# Patient Record
(demographics unavailable — no encounter records)

---

## 2019-01-01 NOTE — NBADM
[place] Admission Note


Date of Admission


2019 at 09:15





History


This is a baby boy born at 39-3/7 weeks of gestational age via spontaneous 

vaginal delivery to a 23-year-old  (G) 2 para (P) 2 mother who is blood 

type O positive, hepatitis B negative, rapid plasma reagin (RPR) negative, HIV 

negative, group B Streptococcus negative. Rupture of membranes one hour prior to

delivery with clear fluid. Apgar scores were 8 at one minute and 9 at five 

minutes. Baby was admitted to the Mother-Baby unit.





Physical Examination


Physical Measurements


On admission, the baby's weight is 3180 grams which is 7 lbs. 0 oz.


, length is 51 cm, and head circumference is 35.5 cm.


Vital Signs





Vital Signs








  Date Time  Temp Pulse Resp B/P (MAP) Pulse Ox O2 Delivery O2 Flow Rate FiO2


 


19 09:58 98.3 153 58 62/30 (41)  Room Air  








General:  Positive: Active, Other (alert and appropriately responsive); 


   Negative: Dysmorphic Features


HEENT:  Positive: Normocephalic, Anterior [place] Open, Positive Red Reflexes

Branden


Heart:  Positive: S1,S2; 


   Negative: Murmur


Lungs:  Positive: Good Bilateral Air Entry; 


   Negative: Grunting and Retractions


Abdomen:  Positive: Soft; 


   Negative: Distended


Male Genitalia:  Positive: Nl Term Male Genitalia


Extremities:  Positive: Other (both hips stable with normal Ortolani and Jacobson 

maneuvers)


Skin:  Positive: Normal for Gestation, Normal Capillary Refill


Neurological:  POSITIVE: Good Tone, Positive Terra Reflex





Asessment


Problems:  


(1) Healthy male 





Plan


1. Admit to mother-baby unit.


2. Routine  care.


3. [Both parents will be] updated on condition and plan for the baby.











Ian Alcantara MD                   2019 13:10

## 2019-01-01 NOTE — DSES
DATE OF ADMISSION:  2019

DATE OF DISCHARGE:  2019

 

DIAGNOSES:

1.  Term male .

2.  Failed hearing screen in the left ear.

3.  Conjunctivitis.

 

PROCEDURES DURING HOSPITALIZATION:

1.  Circumcision performed 2019 by Dr. Alcantara.

2.  Hearing screen.

3.  BiliChek.

 

HISTORY:  This child is a term male  who was delivered by spontaneous

vaginal delivery at [facility] on the morning of 2019.

Mother is 23 years old,  2, para 2.  Her blood type is O positive.  Her

group B Streptococcus screen was negative.  Her hepatitis B surface antigen,

rapid plasma reagin (RPR) and HIV status were all negative.  Rupture of membranes

occurred one hour prior to delivery with clear fluid.  The child was given Apgar

scores of 8 at one minute and 9 at 5 minutes.  Birthweight 3180 grams which is 7

pounds and 0 ounces, head circumference 35.5 cm, length 51 cm.   physical

examination was normal.  The child was given his initial hepatitis B vaccination

on his day delivery.  Mother's blood type is O positive.  The baby's blood type

is also O positive.  I  circumcised the child on 2019 with a Gomco clamp

and local anesthesia.  The procedure was uncomplicated and well tolerated.  The

child passed a hearing screen in his right ear but not in his left ear.  He is

scheduled for follow-up at [facility] on 2019 for a followup

hearing screen.  The child developed yellow eye drainage.  We treated this with

Ciloxan eye drops beginning on 2019, applying two drops to each eye four

times a day.  I sent the Ciloxan eye drops home with the child and instructed his

mother to continue to apply two drops to each eye four times a day for four more

days.  I circumcised the child on 2019 with a Gomco clamp and local

anesthesia.  The procedure was uncomplicated and well tolerated.  Mother

requested that the child be discharged later on the afternoon of 2019.  I

reexamined the child about four hours after the circumcision had been completed.

The circumcision was healing well.  I instructed mother to continue to apply

Vaseline with each diaper change for two more days.  In accordance with his

mother's wishes, the child was discharged on 2019.  His weight on the day

of discharge is 3058 grams, which is 6 pounds and 12 ounces.  On the day of

discharge, the child was alert and responsive.  He had no clinical jaundice, with

a BiliChek of 6.2 and he was breastfeeding well.  He was breathing comfortably in

room air with clear breath sounds and good aeration.  His heart was regular with

no murmur and his abdomen was soft and nondistended.  The child's followup care

is going to be at the [facility] [place].  He is scheduled to be seen on

2019 for his first followup checkup.  The guarantor's insurance number is

to .

## 2020-01-02 NOTE — REPVR
PROCEDURE INFORMATION: 

Exam: US Abdomen Limited 

Exam date and time: 1/2/2020 7:07 PM 

Age: 1 months old 

Clinical indication: Condition or disease; Hernia; Complications not specified; 

Periumbilical; Additional info: Evaluate umbilical hernia 



TECHNIQUE: 

Imaging protocol: Real-time ultrasound of the abdomen with image documentation. 

Examination is focused on the region of clinical interest. 



COMPARISON: 

No relevant prior studies available. 



FINDINGS: 

Soft tissues: Reducible umbilical hernia containing peristalsing bowel. The 

hernia sac measures approximately 1.5 cm in diameter with hernia orifice 

measuring approximately 1.1 cm. 



IMPRESSION: 

Reducible umbilical hernia containing bowel. 



Electronically signed by: Matthew Sequeira On 01/02/2020  20:20:16 PM